# Patient Record
Sex: FEMALE | URBAN - METROPOLITAN AREA
[De-identification: names, ages, dates, MRNs, and addresses within clinical notes are randomized per-mention and may not be internally consistent; named-entity substitution may affect disease eponyms.]

---

## 2018-12-18 LAB
ABS NEUT (OLG): 7 X10(3)/MCL (ref 1.5–6.9)
B-HCG SERPL QL: NEGATIVE
BUN SERPL-MCNC: 14 MG/DL (ref 10–20)
CALCIUM SERPL-MCNC: 9 MG/DL (ref 8–10.5)
CHLORIDE SERPL-SCNC: 104 MMOL/L (ref 100–108)
CO2 SERPL-SCNC: 27 MMOL/L (ref 21–35)
CREAT SERPL-MCNC: 0.6 MG/DL (ref 0.7–1.3)
CREAT/UREA NIT SERPL: 23
ERYTHROCYTE [DISTWIDTH] IN BLOOD BY AUTOMATED COUNT: 12.2 % (ref 11.5–17)
GLUCOSE SERPL-MCNC: 89 MG/DL (ref 75–116)
GROUP & RH: NORMAL
HCT VFR BLD AUTO: 39.3 % (ref 36–48)
HGB BLD-MCNC: 12.5 GM/DL (ref 12–16)
MCH RBC QN AUTO: 28 PG (ref 27–34)
MCHC RBC AUTO-ENTMCNC: 32 GM/DL (ref 31–36)
MCV RBC AUTO: 89 FL (ref 80–99)
PLATELET # BLD AUTO: 339 X10(3)/MCL (ref 140–400)
PMV BLD AUTO: 9.9 FL (ref 6.8–10)
POTASSIUM SERPL-SCNC: 4 MMOL/L (ref 3.6–5.2)
RBC # BLD AUTO: 4.43 X10(6)/MCL (ref 4.2–5.4)
SODIUM SERPL-SCNC: 140 MMOL/L (ref 135–145)
WBC # SPEC AUTO: 10.2 X10(3)/MCL (ref 4.5–11.5)

## 2018-12-26 ENCOUNTER — HISTORICAL (OUTPATIENT)
Dept: ANESTHESIOLOGY | Facility: HOSPITAL | Age: 33
End: 2018-12-26

## 2018-12-26 LAB — B-HCG SERPL QL: NEGATIVE

## 2022-04-30 NOTE — OP NOTE
Preoperative diagnosis: desire removal of tubes to remove Essure implants   Postoperative diagnosis: Same  Suture: Laparoscopic salpingectomy bilateral  Dilatation and hysteroscopy  EBL less than 30 mL   Complications: none  Pathology: Bilateral tubes with implants noted in lumen  Procedure: Space after appropriate informed consent was obtained patient was taken to the operating room where general endotracheal anesthesia was obtained without difficulty.  She was prepped and draped in the usual sterile fashion in the dorsolithotomy position.    A red Rubber was used to drain the bladder.  At this time the speculum was placed anteriorly the cervix address assented tenaculum the Hulka tenaculum was introduced for uterine manipulation.  Single-tooth and speculum were then removed.  Attention was turned to the patient's abdomen where a intraumbilical incision made with scalpel the peritoneum was introduced to obtain pneumoperitoneum.  At this time a 5 mm trocar and sleeve are then introduced the trochars removed the camera was placed confirming Placement.  A Secondary Port Site Were Made in the Suprapubic and Left Hypogastric Region Respectively with a 5 Mm Trocar and Sleeve.  A voyant  endosealer was then used to perform a bilateral salpingectomy without difficulty.  Pedicles were inspected and noted to be hemostatic.  Excess co2 was allowed to escape from the  Abdomen and the incisions  were closed subcuticular fashion using 4-0  Dexon.   Tissues inferior to the patient's vaginal area where the Hulka tenaculum was removed a bivalve speculum was placed the anterior cervix address assented tenaculum uterus was then dilated using Noble dilators to fit the diagnostic hysteroscope and findings of the cavity were found to be within normal limits. .  The procedure was found to be complete.  Patient was awakened and taken to the recovery room stable condition.

## 2025-06-05 DIAGNOSIS — Z12.31 OTHER SCREENING MAMMOGRAM: Primary | ICD-10-CM

## 2025-06-12 ENCOUNTER — HOSPITAL ENCOUNTER (OUTPATIENT)
Dept: RADIOLOGY | Facility: HOSPITAL | Age: 40
Discharge: HOME | End: 2025-06-12
Attending: OBSTETRICS & GYNECOLOGY
Payer: COMMERCIAL

## 2025-06-12 DIAGNOSIS — Z12.31 OTHER SCREENING MAMMOGRAM: ICD-10-CM

## 2025-06-12 PROCEDURE — 77063 BREAST TOMOSYNTHESIS BI: CPT | Mod: TC
